# Patient Record
(demographics unavailable — no encounter records)

---

## 2024-12-18 NOTE — HISTORY OF PRESENT ILLNESS
[Former] : former [>= 20 pack years] : >= 20 pack years [Never] : never [TextBox_4] : 76y/o    male     born in [place]    ex smoker ( 13-63y/o  4- ppd  > 50 pack year)  h/o  CAD  CABG-  hemochromatosis   treated with phlebotomy   - stable -- COPD  BOOP   fibromyalgia followed by Rheum  on prednisone  2.5 bid     f/u  -at rest  feels good - intermittent sob - no chest pain  -trelegy   good  results - albuterol MDI  as needed - britney garcía   currently  -  9/27/2023 76y/o male  ex smoker  CAD/CABG   PV followed by heme   here for COPD   BOOP  fibromyalgia now off  prednisone x 8 weeks - now 170 pounds  improved overall  breathing and    fibromyalgia   - -trelegy   daily - nebulizer as needed     albuterol  prn  -doing well  -  no ches tpain  at times  sob  however resolves - can ambulate several   blocks  1/2 mile - 3/5/2024 77y/o  male ex smoker CAD   CABG  COPD   Fibromyalagia  doing well f/u - nebulizer  prn and doing well - can ambulate  1/4  mile no sob -trelegy good   tolerance   in am only -reviewed ct  as well today  -    9/19/2024 77y/o male ex smoker CAD  CABG   COPD  fibromyalgia  hemochromatosis   abn ct  : -seen by   heme  only  phlebotomy - trelegy 100  ct now with   enlarging  nodule now   8 mm  petct  recommended reviewed today -baseline can ambulate several  blocks no sob   no cough no chest pain  -  12/18/2024 78y/o  male ex smoker CAD  CABG  COPD  fibromyalgia  hemocrhomatosis   DM    abn ct   [facility] Pneumonia - [facility]   treated   12/4- 12/7 [TextBox_11] : 4 [TextBox_13] : 50  [YearQuit] : 2010

## 2024-12-18 NOTE — PHYSICAL EXAM
[No Acute Distress] : no acute distress [No Deformities] : no deformities [IV] : Mallampati Class: IV [Supple] : supple [No Neck Mass] : no neck mass [No JVD] : no jvd [Normal Rate/Rhythm] : normal rate/rhythm [Normal S1, S2] : normal s1, s2 [No Murmurs] : no murmurs [No Resp Distress] : no resp distress [No Acc Muscle Use] : no acc muscle use [Clear to Auscultation Bilaterally] : clear to auscultation bilaterally [Benign] : benign [Not Tender] : not tender [No Masses] : no masses [Soft] : soft [Normal Bowel Sounds] : normal bowel sounds [Normal Gait] : normal gait [No Clubbing] : no clubbing [No Rash] : no rash [No Motor Deficits] : no motor deficits [Normal Affect] : normal affect [TextBox_2] : pleasant male no distress no cough no sob [TextBox_11] : crowded nolesion moist

## 2024-12-18 NOTE — ASSESSMENT
[FreeTextEntry1] : 78y/o  male  1- OOH  [facility]  Pneumonia/AECOPD    12/7/2024      improved    COPD   2-   ex smoker > 30 packyears    9/2024   enlarging  RLL nodule 8 mm  now  3-   fibromyalgia   now off steroids per rheum   4-  h/o BOOP  recent ct 2021  clear lungs / 2018  PFT mixed obs/restrictive  5-   polycythemia   vera    dx 2007   JAK2 negative     6-  vaccinations per primary - flu   recommendations 1-   pet -ct  done at [facility]  to get report requested  2- s/p  doxycycline and abx   [facility]    finished steroids  x two days   3-  tolerating trelegy   4- prn albuerol   5- cxr 6- PFT once improved   discussion chart reviewed    imaging      -imaging reviewed and ddx  pet ct discussed agreement on plan

## 2024-12-18 NOTE — PROCEDURE
[FreeTextEntry1] : 12/18/2024  NIOX   19 ppb   ACC: [id number] EXAM: CT CHEST ORDERED BY: NOELLE HERNANDEZ  PROCEDURE DATE: 08/26/2024    INTERPRETATION: CLINICAL INDICATION: HISTORY OF ORGANIZING PNEUMONIA, COPD, CHRONIC BRONCHITIS.  Axial CT images of the chest are obtained without intravenous administration of contrast.  Comparison is made with the prior chest of August 24, 2023.  No enlarged axillary lymph nodes. Multiple mediastinal lymph nodes with the largest in a subcarinal location measuring about 1.3 cm in short axis are unchanged.  Heart size is enlarged. Status post CABG. Vascular calcifications with involvement of the aorta and the coronary arteries. No pleural or pericardial effusions.  Evaluation of the upper abdomen demonstrate cholelithiasis. Hypodensities within the partially imaged kidneys likely cysts.  Evaluation of the lungs demonstrate emphysema.  Cluster of a few nodules within the right upper lobe measuring up to about 4 mm on image 57 of series 3 new since August 24, 2023 suggestive of foci of impacted distal airways. An adjacent 4 mm right upper lobe nodule on image 50 of series 3 is also new.  Cluster of a few adjacent nodules within the right middle lobe largest measuring about 6 mm abutting the mediastinum on image 100 of series 3 new since August 24, 2023 may be sequela of impacted distal airways.  4 mm right lower lobe nodule on image 95 of series 3 appears to have increased in size since August 24, 2023. 4 mm right lower lobe nodule on image 87 of series 3 is slightly increased in size. 8mm right lower lobe previously described nodule on image 61 of series 3 with noticeable interval increase in size since August 24, 2023 when it measured about 4 mm.  2 mm right upper lobe nodule on image 37 of series 3 is unchanged. 3 mm left lower lobe calcified granuloma.  No central endobronchial lesions.  Spinal degenerative changes.  IMPRESSION: Dominant 8mm right lower lobe nodule with interval increase in size since August 24, 2023. Primary differential diagnostic consideration is a neoplasm including primary lung neoplasm or metastatic disease. 2 other right lower lobe 4 mm nodules also appear to have slightly increased in size since August 24, 2023.  A few other right lung nodules new since the prior study measuring up to about 6 mm may be sequela of impacted distal airways given their clustered appearance.  PET CT can be performed for further evaluation.  --- End of Report ---      WILFRED HAGER MD; Attending Radiologist This document has been electronically signed. Sep 10 2024 2:41PMPFT     6/22/2023 weight  180 pound   FVC 2.21  63%  FEV1  1.49  59 %  R  reduced   fef 25-75%  reduced   MVV is reduced TLC 4.42  73%   dlco 74% PFT 6/22/2023 Fair efforts FVC is reduced FEV1 is reduced (1.49L 59% )  FEV1/FVC is reduced There is no significant bronchodilator response FEF 25-75% is reduced MVV is reduced Lung volumes by body plethysmography reveal reduced SVC TLC and increased RV/TLC DLCO and DLCo/Va are normal Impression  Moderate irreversible obstructive airflow pattern with air trapping and mild restrictive ventilatory defect  Reduced MVV may be due to poor efforts or neuromuscular weakness  DLCO is normal  Study consistent with COPD  Recommend clinical correlation  PFT 2018       FVC 2.13  60 %  FEV1  1.48   58%  no bd  ( 9% in fev1)   R  70   + small airway   TLC  via BB 63%

## 2024-12-18 NOTE — REVIEW OF SYSTEMS
[Back Pain] : back pain [Diabetes] : diabetes [Obesity] : obesity [Ear Disturbance] : ear disturbance [Fever] : no fever [Recent Wt Gain (___ Lbs)] : ~T no recent weight gain [Chills] : no chills [Recent Wt Loss (___ Lbs)] : ~T no recent weight loss [Dry Eyes] : no dry eyes [Sore Throat] : no sore throat [Sinus Problems] : no sinus problems [Cough] : no cough [Hemoptysis] : no hemoptysis [Chest Tightness] : no chest tightness [Sputum] : no sputum [Dyspnea] : no dyspnea [Wheezing] : no wheezing [SOB on Exertion] : no sob on exertion [Chest Discomfort] : no chest discomfort [Palpitations] : no palpitations [GERD] : no gerd [Abdominal Pain] : no abdominal pain [Nausea] : no nausea [Vomiting] : no vomiting [Dysphagia] : no dysphagia [Bleeding] : no bleeding [Myalgias] : no myalgias [Chronic Pain] : no chronic pain [Rash] : no rash [Blood Transfusion] : no blood transfusion [Clotting Disorder/ Frequent bleeding] : no clotting disorder/ frequent bleeding [Panic Attacks] : no panic attacks [Thyroid Problem] : no thyroid problem [TextBox_14] : macular   degeneration  [TextBox_91] : fibromyalgia   legs  shoulder  [TextBox_110] : hemachromatosis  [TextBox_141] : metformin

## 2024-12-18 NOTE — REASON FOR VISIT
[COPD] : COPD [Follow-Up - From Hospitalization] : a follow-up visit after a recent hospitalization [Pneumonia] : pneumonia [Spouse] : spouse

## 2025-03-07 NOTE — ASSESSMENT
[FreeTextEntry1] : Mr. MCFADDEN's questions were answered to his satisfaction. He will repeat BW next month, then return to the office in 6 months.

## 2025-03-07 NOTE — REVIEW OF SYSTEMS
[Vision Problems] : vision problems [Negative] : Neurological [Chest Pain] : no chest pain [Palpitations] : no palpitations [Constipation] : no constipation [FreeTextEntry3] : left eye blindness, status post cataract surgery [FreeTextEntry6] : status post recent COPD exacerbation in May 2018

## 2025-03-07 NOTE — HISTORY OF PRESENT ILLNESS
[de-identified] : 79M, ex- heavy tobacco user, with PMHx HTN, BOOP, COPD, myocardial infarction, osteoarthritis, returning for hematologic follow-up of polycythemia vera, diagnosed in August 2007.  CASE SYNOPSIS: 12/4/2024 CT chest pulmonary angiogram ([facility])- IMPRESSION:   1.  No evidence of PE   2.  Multifocal consolidation of bilateral lower lobe suspicious for PNA or aspiration.  Follow-up chest CT in 6-8 weeks after appropriate treatment is recommended to ensure resolution and exclude the presence of underlying malignancy.  [FreeTextEntry1] : PRN therapeutic phlebotomies Therapeutic phlebotomies:  [de-identified] : Reporting no changes in clinical status and no new symptoms since August 2024 when he was last seen in the office.  Physical exam unchanged from previous visit.  Denies B symptoms; polymyalgia under control off steroids.  Had cataract surgery in October 2024-successful.  Has not been phlebotomized in many years.  Hemoglobin stable between 15 and 16 g/dL, WBC and platelets normal.  In interim follow-up with Dr. Murillo (pulmonary) after diagnosis of PNA in December 2024; had recent CT chest angiogram at [facility], which showed multifocal consolidation of bilateral lower lobe suspicious for pneumonia or aspiration, but no evidence of PE.  Accompanied by wife.

## 2025-03-07 NOTE — RESULTS/DATA
[FreeTextEntry1] : I reviewed recent + today's blood work results with patient.  6/16/2023:  WBC 21, hemoglobin 13.3g/dL, hematocrit 42.1%, platelets 246,000  7/28/22: WBC 11.81, hemoglobin 14.8 g/dL, hematocrit 44.3%, platelet 271,000  2/26/2022: WBC 12.45, hemoglobin is 15.3 g/dL, hematocrit 44.9%, platelet 226,000 Ferritin 422 ng/mL

## 2025-04-02 NOTE — HISTORY OF PRESENT ILLNESS
[Former] : former [>= 20 pack years] : >= 20 pack years [Never] : never [TextBox_4] : 76y/o    male     born in [place]    ex smoker ( 13-65y/o  4- ppd  > 50 pack year)  h/o  CAD  CABG-  hemochromatosis   treated with phlebotomy   - stable -- COPD  BOOP   fibromyalgia followed by Rheum  on prednisone  2.5 bid     f/u  -at rest  feels good - intermittent sob - no chest pain  -trelegy   good  results - albuterol MDI  as needed - britney garcía   currently  -  9/27/2023 76y/o male  ex smoker  CAD/CABG   PV followed by heme   here for COPD   BOOP  fibromyalgia now off  prednisone x 8 weeks - now 170 pounds  improved overall  breathing and    fibromyalgia   - -trelegy   daily - nebulizer as needed     albuterol  prn  -doing well  -  no ches tpain  at times  sob  however resolves - can ambulate several   blocks  1/2 mile - 3/5/2024 77y/o  male ex smoker CAD   CABG  COPD   Fibromyalagia  doing well f/u - nebulizer  prn and doing well - can ambulate  1/4  mile no sob -trelegy good   tolerance   in am only -reviewed ct  as well today  -    9/19/2024 77y/o male ex smoker CAD  CABG   COPD  fibromyalgia  hemochromatosis   abn ct  : -seen by   heme  only  phlebotomy - trelegy 100  ct now with   enlarging  nodule now   8 mm  petct  recommended reviewed today -baseline can ambulate several  blocks no sob   no cough no chest pain  -  12/18/2024 80y/o  male ex smoker CAD  CABG  COPD  fibromyalgia  hemochromatosis   DM    abn ct   [facility] - [facility]   treated   12/4- 12/7 4/2/2025 80y/o  male ex smoker  CAD   CABG    COPD    fibromyalgia    hemochromotosis  DM  abn  CT nodule  f/u - reviewed ct with his primary  - now will see  surgeon in  [facility]  and he ordered PFT     - no cough no sob - using his trelegy doing well    -f/u   ct ACC: [id number] EXAM: CT CHEST ORDERED BY: NOELLE HERNANDEZ  PROCEDURE DATE: 03/19/2025    INTERPRETATION: INDICATION: Lung nodule  TECHNIQUE: Volumetric images of the chest without intravenous contrast. Maximum intensity projection images were generated.  COMPARISON: Chest CT August 26, 2024.  FINDINGS:  CARDIOVASCULAR, MEDIASTINUM:  Vasculature: Thoracic aorta: Normal in caliber and course with calcifications.  Heart and pericardium: Normal heart size. Status post CABG. No pericardial effusion.  Esophagus: Normally decompressed. LYMPH NODES: No ediastinal or axillary lymphadenopathy. No gross hilar adenopathy. LUNGS/AIRWAYS/PLEURA: Patent central airways with layering tracheal secretions. Emphysema. A 1.1 x 1.0 cm right lower lobe nodule adjacent to the right major fissure (series 3, image 70) has increased in size since August 26, 2024 when it measured 0.8 cm. A few additional nodules are unchanged including two 0.4 cm right lower lobe nodules (series 3, images 104 and 96). No pleural effusion or pneumothorax. UPPER ABDOMEN: Partially imaged left renal hypodensity. Cholelithiasis. BONES: Median sternotomy IMPRESSION: A 1.1 cm right lower lobe solid nodule has increased in size since August 26, 2024. The leading diagnostic consideration is neoplastic. Additional few pulmonary nodules are unchanged. --- End of Report --- DELILAH RICHARDS DO; Attending Radiologist This document has been electronically signed. Mar 24 2025 3:41PM [TextBox_11] : 4 [TextBox_13] : 50  [YearQuit] : 2010

## 2025-04-02 NOTE — PHYSICAL EXAM
[IV] : Mallampati Class: IV [Normal Appearance] : normal appearance [Supple] : supple [No Neck Mass] : no neck mass [No JVD] : no jvd [Normal Rate/Rhythm] : normal rate/rhythm [Normal S1, S2] : normal s1, s2 [No Murmurs] : no murmurs [No Resp Distress] : no resp distress [No Acc Muscle Use] : no acc muscle use [Clear to Auscultation Bilaterally] : clear to auscultation bilaterally [Benign] : benign [Not Tender] : not tender [No Masses] : no masses [Soft] : soft [No Hernias] : no hernias [Normal Bowel Sounds] : normal bowel sounds [Normal Gait] : normal gait [No Clubbing] : no clubbing [No Edema] : no edema [No Rash] : no rash [No Motor Deficits] : no motor deficits [Normal Affect] : normal affect [TextBox_2] : pleasant male no distress no cough no sob  [TextBox_11] : crowded no lesion moist

## 2025-04-02 NOTE — ASSESSMENT
[FreeTextEntry1] : 78y/o  male  1- ct 3/2025 enlarging  RLL nodule      ex-smoker high risk  weight loss now   170 pounds   2-   COPD  no desaturation on exercise today    3-   fibromyalgia   now off steroids per rheum   4-  h/o BOOP  recent ct 2021  clear lungs / 2018  PFT mixed obs/restrictive  5-   polycythemia   vera    dx 2007   JAK2 negative     6-  CABG  CAD 7-  vaccinations per primary - flu   recommendations 1-    trelegy  tolerated  2- albuterol  3- [facility]  CT SX  appointment this week  with  PFT and pulm  f/u there since he prefers surgery there     - reviewed imaging and  exercise walk today  - would like  all surgery  at [facility]  because his cardiologist is there  -

## 2025-04-02 NOTE — PROCEDURE
[FreeTextEntry1] : 4/2/2025  resting sats room air   97%  heart rate  71   walk and stairs  room air      sts 95% heart rate  110     ACC: [id number] EXAM: CT CHEST ORDERED BY: NOELLE HERNANDEZ  PROCEDURE DATE: 03/19/2025 INTERPRETATION: INDICATION: Lung nodule TECHNIQUE: Volumetric images of the chest without intravenous contrast. Maximum intensity projection images were generated. COMPARISON: ChestCT August 26, 2024.  FINDINGS:  CARDIOVASCULAR, MEDIASTINUM:  Vasculature: Thoracic aorta: Normal in caliber and course with calcifications. Heart and pericardium: Normal heart size. Status post CABG. No pericardial effusion. Esophagus: Normally decmpressed. LYMPH NODES: No mediastinal or axillary lymphadenopathy. No gross hilar adenopathy. LUNGS/AIRWAYS/PLEURA: Patent central airways with layering tracheal secretions. Emphysema. A 1.1 x 1.0 cm right lower lobe nodule adjacent to the right major fissure (series 3, image 70) has increased in size since August 26, 2024 when it measured 0.8 cm. A few additional nodules are unchanged including two 0.4 cm right lower lobe nodules (series 3, images 104 and 96). No pleural effusion or pneumothorax.  UPPER ABDOMEN: Partially imaged left renal hypodensity. Cholelithiasis.  BONES: Median sternotomy  IMPRESSION:  A 1.1 cm right lower lobe solid nodule has increased in size since August 26, 2024. The leading diagnostic consideration is neoplastic.  Additional few pulmonary nodules are unchanged.  --- End of Report ---      DELILAH RICHARDS DO; Attending Radiologist This document has been electronically signed. Mar 24 2025 3:41PM12/18/2024  NIOX   19 ppb   ACC: [id number] EXAM: CT CHEST ORDERED BY: NOELLE HERNANDEZ  PROCEDURE DATE: 08/26/2024    INTERPRETATION: CLINICAL INDICATION: HISTORY OF ORGANIZING PNEUMONIA, COPD, CHRONIC BRONCHITIS.  Axial CT images of the chest are obtained without intravenous administration of contrast.  Comparison is made with the prior chest of August 24, 2023.  No enlarged axillary lymph nodes. Multiple mediastinal lymph nodes with the largest in a subcarinal location measuring about 1.3 cm in short axis are unchanged.  Heart size is enlarged. Status post CABG. Vascular calcifications with involvement of the aorta and the coronary arteries. No pleural or pericardial effusions.  Evaluation of the upper abdomen demonstrate cholelithiasis. Hypodensities within the partially imaged kidneys likely cysts.  Evaluation of the lungs demonstrate emphysema.  Cluster of a few nodules within the right upper lobe measuring up to about 4 mm on image 57 of series 3 new since August 24, 2023 suggestive of foci of impacted distal airways. An adjacent 4 mm right upper lobe nodule on image 50 of series 3 is also new.  Cluster of a few adjacent nodules within the right middle lobe largest measuring about 6 mm abutting the mediastinum on image 100 of series 3 new since August 24, 2023 may be sequela of impacted distal airways.  4 mm right lower lobe nodule on image 95 of series 3 appears to have increased in size since August 24, 2023. 4 mm right lower lobe nodule on image 87 of series 3 is slightly increased in size. 8mm right lower lobe previously described nodule on image 61 of series 3 with noticeable interval increase in size since August 24, 2023 when it measured about 4 mm.  2 mm right upper lobe nodule on image 37 of series 3 is unchanged. 3 mm left lower lobe calcified granuloma.  No central endobronchial lesions.  Spinal degenerative changes.  IMPRESSION: Dominant 8mm right lower lobe nodule with interval increase in size since August 24, 2023. Primary differential diagnostic consideration is a neoplasm including primary lung neoplasm or metastatic disease. 2 other right lower lobe 4 mm nodules also appear to have slightly increased in size since August 24, 2023.  A few other right lung nodules new since the prior study measuring up to about 6 mm may be sequela of impacted distal airways given their clustered appearance.  PET CT can be performed for further evaluation.  --- End of Report ---      WILFRED HAGER MD; Attending Radiologist This document has been electronically signed. Sep 10 2024 2:41PMPFT     6/22/2023 weight  180 pound   FVC 2.21  63%  FEV1  1.49  59 %  R  reduced   fef 25-75%  reduced   MVV is reduced TLC 4.42  73%   dlco 74% PFT 6/22/2023 Fair efforts FVC is reduced FEV1 is reduced (1.49L 59% )  FEV1/FVC is reduced There is no significant bronchodilator response FEF 25-75% is reduced MVV is reduced Lung volumes by body plethysmography reveal reduced SVC TLC and increased RV/TLC DLCO and DLCo/Va are normal Impression  Moderate irreversible obstructive airflow pattern with air trapping and mild restrictive ventilatory defect  Reduced MVV may be due to poor efforts or neuromuscular weakness  DLCO is normal  Study consistent with COPD  Recommend clinical correlation  PFT 2018       FVC 2.13  60 %  FEV1  1.48   58%  no bd  ( 9% in fev1)   R  70   + small airway   TLC  via BB 63%

## 2025-04-02 NOTE — REVIEW OF SYSTEMS
[Ear Disturbance] : ear disturbance [Back Pain] : back pain [Diabetes] : diabetes [Obesity] : obesity [Fever] : no fever [Recent Wt Gain (___ Lbs)] : ~T no recent weight gain [Chills] : no chills [Recent Wt Loss (___ Lbs)] : ~T no recent weight loss [Dry Eyes] : no dry eyes [Sore Throat] : no sore throat [Sinus Problems] : no sinus problems [Cough] : no cough [Hemoptysis] : no hemoptysis [Chest Tightness] : no chest tightness [Sputum] : no sputum [Dyspnea] : no dyspnea [Wheezing] : no wheezing [SOB on Exertion] : no sob on exertion [Chest Discomfort] : no chest discomfort [Palpitations] : no palpitations [GERD] : no gerd [Abdominal Pain] : no abdominal pain [Nausea] : no nausea [Vomiting] : no vomiting [Dysphagia] : no dysphagia [Bleeding] : no bleeding [Myalgias] : no myalgias [Chronic Pain] : no chronic pain [Rash] : no rash [Blood Transfusion] : no blood transfusion [Clotting Disorder/ Frequent bleeding] : no clotting disorder/ frequent bleeding [Panic Attacks] : no panic attacks [Thyroid Problem] : no thyroid problem [TextBox_14] : macular   degeneration  [TextBox_91] : fibromyalgia   legs  shoulder  [TextBox_110] : hemachromatosis  [TextBox_141] : metformin

## 2025-06-06 NOTE — QUALITY MEASURES
[Spirometry documented and] : spirometr documented and reviewed in record [Patient unable to perform] : patient is unable to perform spirometry no loss of consciousness, no gait abnormality, no headache, no sensory deficits, and no weakness.

## 2025-06-08 NOTE — PROCEDURE
[FreeTextEntry1] : 4/2/2025  resting sats room air   97%  heart rate  71   walk and stairs  room air      sts 95% heart rate  110     ACC: [id number] EXAM: CT CHEST ORDERED BY: NOELLE HERNANDEZ  PROCEDURE DATE: 03/19/2025 INTERPRETATION: INDICATION: Lung nodule TECHNIQUE: Volumetric images of the chest without intravenous contrast. Maximum intensity projection images were generated. COMPARISON: ChestCT August 26, 2024.  FINDINGS:  CARDIOVASCULAR, MEDIASTINUM:  Vasculature: Thoracic aorta: Normal in caliber and course with calcifications. Heart and pericardium: Normal heart size. Status post CABG. No pericardial effusion. Esophagus: Normally decmpressed. LYMPH NODES: No mediastinal or axillary lymphadenopathy. No gross hilar adenopathy. LUNGS/AIRWAYS/PLEURA: Patent central airways with layering tracheal secretions. Emphysema. A 1.1 x 1.0 cm right lower lobe nodule adjacent to the right major fissure (series 3, image 70) has increased in size since August 26, 2024 when it measured 0.8 cm. A few additional nodules are unchanged including two 0.4 cm right lower lobe nodules (series 3, images 104 and 96). No pleural effusion or pneumothorax.  UPPER ABDOMEN: Partially imaged left renal hypodensity. Cholelithiasis.  BONES: Median sternotomy  IMPRESSION:  A 1.1 cm right lower lobe solid nodule has increased in size since August 26, 2024. The leading diagnostic consideration is neoplastic.  Additional few pulmonary nodules are unchanged.  --- End of Report ---      DELILAH RICHARDS DO; Attending Radiologist This document has been electronically signed. Mar 24 2025 3:41PM12/18/2024  NIOX   19 ppb   ACC: [id number] EXAM: CT CHEST ORDERED BY: NOELLE HERNANDEZ  PROCEDURE DATE: 08/26/2024    INTERPRETATION: CLINICAL INDICATION: HISTORY OF ORGANIZING PNEUMONIA, COPD, CHRONIC BRONCHITIS.  Axial CT images of the chest are obtained without intravenous administration of contrast.  Comparison is made with the prior chest of August 24, 2023.  No enlarged axillary lymph nodes. Multiple mediastinal lymph nodes with the largest in a subcarinal location measuring about 1.3 cm in short axis are unchanged.  Heart size is enlarged. Status post CABG. Vascular calcifications with involvement of the aorta and the coronary arteries. No pleural or pericardial effusions.  Evaluation of the upper abdomen demonstrate cholelithiasis. Hypodensities within the partially imaged kidneys likely cysts.  Evaluation of the lungs demonstrate emphysema.  Cluster of a few nodules within the right upper lobe measuring up to about 4 mm on image 57 of series 3 new since August 24, 2023 suggestive of foci of impacted distal airways. An adjacent 4 mm right upper lobe nodule on image 50 of series 3 is also new.  Cluster of a few adjacent nodules within the right middle lobe largest measuring about 6 mm abutting the mediastinum on image 100 of series 3 new since August 24, 2023 may be sequela of impacted distal airways.  4 mm right lower lobe nodule on image 95 of series 3 appears to have increased in size since August 24, 2023. 4 mm right lower lobe nodule on image 87 of series 3 is slightly increased in size. 8mm right lower lobe previously described nodule on image 61 of series 3 with noticeable interval increase in size since August 24, 2023 when it measured about 4 mm.  2 mm right upper lobe nodule on image 37 of series 3 is unchanged. 3 mm left lower lobe calcified granuloma.  No central endobronchial lesions.  Spinal degenerative changes.  IMPRESSION: Dominant 8mm right lower lobe nodule with interval increase in size since August 24, 2023. Primary differential diagnostic consideration is a neoplasm including primary lung neoplasm or metastatic disease. 2 other right lower lobe 4 mm nodules also appear to have slightly increased in size since August 24, 2023.  A few other right lung nodules new since the prior study measuring up to about 6 mm may be sequela of impacted distal airways given their clustered appearance.  PET CT can be performed for further evaluation.  --- End of Report ---      WILFRED HAGER MD; Attending Radiologist This document has been electronically signed. Sep 10 2024 2:41PMPFT     6/22/2023 weight  180 pound   FVC 2.21  63%  FEV1  1.49  59 %  R  reduced   fef 25-75%  reduced   MVV is reduced TLC 4.42  73%   dlco 74% PFT 6/22/2023 Fair efforts FVC is reduced FEV1 is reduced (1.49L 59% )  FEV1/FVC is reduced There is no significant bronchodilator response FEF 25-75% is reduced MVV is reduced Lung volumes by body plethysmography reveal reduced SVC TLC and increased RV/TLC DLCO and DLCo/Va are normal Impression  Moderate irreversible obstructive airflow pattern with air trapping and mild restrictive ventilatory defect  Reduced MVV may be due to poor efforts or neuromuscular weakness  DLCO is normal  Study consistent with COPD  Recommend clinical correlation  PFT 2018       FVC 2.13  60 %  FEV1  1.48   58%  no bd  ( 9% in fev1)   R  70   + small airway   TLC  via BB 63%

## 2025-06-08 NOTE — PROCEDURE
[FreeTextEntry1] : 4/2/2025  resting sats room air   97%  heart rate  71   walk and stairs  room air      sts 95% heart rate  110     ACC: [id number] EXAM: CT CHEST ORDERED BY: NOELLE HERNANDEZ  PROCEDURE DATE: 03/19/2025 INTERPRETATION: INDICATION: Lung nodule TECHNIQUE: Volumetric images of the chest without intravenous contrast. Maximum intensity projection images were generated. COMPARISON: ChestCT August 26, 2024.  FINDINGS:  CARDIOVASCULAR, MEDIASTINUM:  Vasculature: Thoracic aorta: Normal in caliber and course with calcifications. Heart and pericardium: Normal heart size. Status post CABG. No pericardial effusion. Esophagus: Normally decmpressed. LYMPH NODES: No mediastinal or axillary lymphadenopathy. No gross hilar adenopathy. LUNGS/AIRWAYS/PLEURA: Patent central airways with layering tracheal secretions. Emphysema. A 1.1 x 1.0 cm right lower lobe nodule adjacent to the right major fissure (series 3, image 70) has increased in size since August 26, 2024 when it measured 0.8 cm. A few additional nodules are unchanged including two 0.4 cm right lower lobe nodules (series 3, images 104 and 96). No pleural effusion or pneumothorax.  UPPER ABDOMEN: Partially imaged left renal hypodensity. Cholelithiasis.  BONES: Median sternotomy  IMPRESSION:  A 1.1 cm right lower lobe solid nodule has increased in size since August 26, 2024. The leading diagnostic consideration is neoplastic.  Additional few pulmonary nodules are unchanged.  --- End of Report ---      DELILAH RICHARDS DO; Attending Radiologist This document has been electronically signed. Mar 24 2025 3:41PM12/18/2024  NIOX   19 ppb   ACC: [id number] EXAM: CT CHEST ORDERED BY: NOELLE HERNANDEZ  PROCEDURE DATE: 08/26/2024    INTERPRETATION: CLINICAL INDICATION: HISTORY OF ORGANIZING PNEUMONIA, COPD, CHRONIC BRONCHITIS.  Axial CT images of the chest are obtained without intravenous administration of contrast.  Comparison is made with the prior chest of August 24, 2023.  No enlarged axillary lymph nodes. Multiple mediastinal lymph nodes with the largest in a subcarinal location measuring about 1.3 cm in short axis are unchanged.  Heart size is enlarged. Status post CABG. Vascular calcifications with involvement of the aorta and the coronary arteries. No pleural or pericardial effusions.  Evaluation of the upper abdomen demonstrate cholelithiasis. Hypodensities within the partially imaged kidneys likely cysts.  Evaluation of the lungs demonstrate emphysema.  Cluster of a few nodules within the right upper lobe measuring up to about 4 mm on image 57 of series 3 new since August 24, 2023 suggestive of foci of impacted distal airways. An adjacent 4 mm right upper lobe nodule on image 50 of series 3 is also new.  Cluster of a few adjacent nodules within the right middle lobe largest measuring about 6 mm abutting the mediastinum on image 100 of series 3 new since August 24, 2023 may be sequela of impacted distal airways.  4 mm right lower lobe nodule on image 95 of series 3 appears to have increased in size since August 24, 2023. 4 mm right lower lobe nodule on image 87 of series 3 is slightly increased in size. 8mm right lower lobe previously described nodule on image 61 of series 3 with noticeable interval increase in size since August 24, 2023 when it measured about 4 mm.  2 mm right upper lobe nodule on image 37 of series 3 is unchanged. 3 mm left lower lobe calcified granuloma.  No central endobronchial lesions.  Spinal degenerative changes.  IMPRESSION: Dominant 8mm right lower lobe nodule with interval increase in size since August 24, 2023. Primary differential diagnostic consideration is a neoplasm including primary lung neoplasm or metastatic disease. 2 other right lower lobe 4 mm nodules also appear to have slightly increased in size since August 24, 2023.  A few other right lung nodules new since the prior study measuring up to about 6 mm may be sequela of impacted distal airways given their clustered appearance.  PET CT can be performed for further evaluation.  --- End of Report ---      WILFRED HAGER MD; Attending Radiologist This document has been electronically signed. Sep 10 2024 2:41PMPFT     6/22/2023 weight  180 pound   FVC 2.21  63%  FEV1  1.49  59 %  R  reduced   fef 25-75%  reduced   MVV is reduced TLC 4.42  73%   dlco 74% PFT 6/22/2023 Fair efforts FVC is reduced FEV1 is reduced (1.49L 59% )  FEV1/FVC is reduced There is no significant bronchodilator response FEF 25-75% is reduced MVV is reduced Lung volumes by body plethysmography reveal reduced SVC TLC and increased RV/TLC DLCO and DLCo/Va are normal Impression  Moderate irreversible obstructive airflow pattern with air trapping and mild restrictive ventilatory defect  Reduced MVV may be due to poor efforts or neuromuscular weakness  DLCO is normal  Study consistent with COPD  Recommend clinical correlation  PFT 2018       FVC 2.13  60 %  FEV1  1.48   58%  no bd  ( 9% in fev1)   R  70   + small airway   TLC  via BB 63%

## 2025-06-08 NOTE — PHYSICAL EXAM
[IV] : Mallampati Class: IV [Normal Appearance] : normal appearance [Supple] : supple [No Neck Mass] : no neck mass [No JVD] : no jvd [Normal Rate/Rhythm] : normal rate/rhythm [Normal S1, S2] : normal s1, s2 [No Murmurs] : no murmurs [No Resp Distress] : no resp distress [No Acc Muscle Use] : no acc muscle use [Clear to Auscultation Bilaterally] : clear to auscultation bilaterally [Benign] : benign [Not Tender] : not tender [No Masses] : no masses [Soft] : soft [No Hernias] : no hernias [Normal Bowel Sounds] : normal bowel sounds [Normal Gait] : normal gait [Gait - Sufficient For Exercise Testing] : gait sufficient for exercise testing [No Clubbing] : no clubbing [No Edema] : no edema [No Rash] : no rash [No Motor Deficits] : no motor deficits [Normal Affect] : normal affect [TextBox_2] : pleasant   male no distress  no cough  no sob [TextBox_11] : no lesion   moist

## 2025-06-08 NOTE — ASSESSMENT
[FreeTextEntry1] : 78y/o  male  1- ct 4/2025  s/p right wedge resection     adeno   pT2a N0   ct with effusion         then ED visit 5/15/2025  CTPA    no PE   bilateral effusions right > left post op changes   2-   COPD      3-   fibromyalgia   now off steroids per rheum   4-  h/o BOOP  recent ct 2021  clear lungs / 2018  PFT mixed obs/restrictive  5-   polycythemia   vera    dx 2007   JAK2 negative     6-  CABG  CAD 7-  vaccinations per primary - flu   recommendations 1-    trelegy  tolerated  2- albuterol  3-   f/u  ct sx  / cardiology for effusions   -   st landeros this week  discussion and agreement on plan

## 2025-06-08 NOTE — HISTORY OF PRESENT ILLNESS
[Former] : former [>= 20 pack years] : >= 20 pack years [Never] : never [TextBox_4] : 78y/o    male     born in [place]    ex smoker ( 13-65y/o  4- ppd  > 50 pack year)  h/o  CAD  CABG-  hemochromatosis   treated with phlebotomy   - stable -- COPD  BOOP   fibromyalgia followed by Rheum  on prednisone  2.5 bid     f/u  -at rest  feels good - intermittent sob - no chest pain  -trelegy   good  results - albuterol MDI  as needed - britney garcía   currently  -  9/27/2023 78y/o male  ex smoker  CAD/CABG   PV followed by heme   here for COPD   BOOP  fibromyalgia now off  prednisone x 8 weeks - now 170 pounds  improved overall  breathing and    fibromyalgia   - -trelegy   daily - nebulizer as needed     albuterol  prn  -doing well  -  no ches tpain  at times  sob  however resolves - can ambulate several   blocks  1/2 mile - 3/5/2024 77y/o  male ex smoker CAD   CABG  COPD   Fibromyalagia  doing well f/u - nebulizer  prn and doing well - can ambulate  1/4  mile no sob -trelegy good   tolerance   in am only -reviewed ct  as well today  -    9/19/2024 77y/o male ex smoker CAD  CABG   COPD  fibromyalgia  hemochromatosis   abn ct  : -seen by   heme  only  phlebotomy - trelegy 100  ct now with   enlarging  nodule now   8 mm  petct  recommended reviewed today -baseline can ambulate several  blocks no sob   no cough no chest pain  -  12/18/2024 78y/o  male ex smoker CAD  CABG  COPD  fibromyalgia  hemochromatosis   DM    abn ct   [facility] - [facility]   treated   12/4- 12/7 4/2/2025 78y/o  male ex smoker  CAD   CABG    COPD    fibromyalgia    hemochromotosis  DM  abn  CT nodule  f/u - reviewed ct with his primary  - now will see  surgeon in  [facility]  and he ordered PFT     - no cough no sob - using his trelegy doing well    -f/u   ct ACC: [id number] EXAM: CT CHEST ORDERED BY: NOELLE HERNANDEZ  PROCEDURE DATE: 03/19/2025    INTERPRETATION: INDICATION: Lung nodule  TECHNIQUE: Volumetric images of the chest without intravenous contrast. Maximum intensity projection images were generated.  COMPARISON: Chest CT August 26, 2024.  FINDINGS:  CARDIOVASCULAR, MEDIASTINUM:  Vasculature: Thoracic aorta: Normal in caliber and course with calcifications.  Heart and pericardium: Normal heart size. Status post CABG. No pericardial effusion.  Esophagus: Normally decompressed. LYMPH NODES: No ediastinal or axillary lymphadenopathy. No gross hilar adenopathy. LUNGS/AIRWAYS/PLEURA: Patent central airways with layering tracheal secretions. Emphysema. A 1.1 x 1.0 cm right lower lobe nodule adjacent to the right major fissure (series 3, image 70) has increased in size since August 26, 2024 when it measured 0.8 cm. A few additional nodules are unchanged including two 0.4 cm right lower lobe nodules (series 3, images 104 and 96). No pleural effusion or pneumothorax. UPPER ABDOMEN: Partially imaged left renal hypodensity. Cholelithiasis. BONES: Median sternotomy IMPRESSION: A 1.1 cm right lower lobe solid nodule has increased in size since August 26, 2024. The leading diagnostic consideration is neoplastic. Additional few pulmonary nodules are unchanged. --- End of Report --- DELILAH RICHARDS DO; Attending Radiologist This document has been electronically signed. Mar 24 2025 3:41PM   6/6/2025 78y/o male ex smoker CAD CABG COPD fibromyalgia hemochromotosis DM   [facility] 4/26/2025   [facility] -- s/p  wedge   resection -  Dr yee Cook  -      RLL  nodule      + adenocarcinoma    pT2 a    pN0   f/u - then returned to hospital  some  right sided pain        ED   5/15/2025  CTPA    no PE   bilateral effusions right > left post op changes  RVP was neg    ctpa 5/15/2025   right pleural  effusion  -currently no  sob no cough  -trelegy   and albuterol prn  -  [TextBox_11] : 4 [TextBox_13] : 50  [YearQuit] : 2010